# Patient Record
Sex: FEMALE | Race: ASIAN
[De-identification: names, ages, dates, MRNs, and addresses within clinical notes are randomized per-mention and may not be internally consistent; named-entity substitution may affect disease eponyms.]

---

## 2018-02-27 LAB
BASOPHILS # BLD: 0.02 K/UL (ref 0–0.2)
BASOPHILS NFR BLD: 0.3 %
EOS ABS #: 0.06 K/UL (ref 0–0.5)
EOSINOPHIL NFR BLD AUTO: 154 K/UL (ref 130–400)
HCT VFR BLD CALC: 32.8 % (ref 37–47)
HGB BLD-MCNC: 10.9 G/DL (ref 12–16)
IG#: 0.12 K/UL (ref 0–0.02)
IMM GRANULOCYTES NFR BLD AUTO: 19.2 %
LYMPHOCYTES # BLD: 1.43 K/UL (ref 1.2–3.4)
MCH RBC QN AUTO: 27.9 PG (ref 25–34)
MCHC RBC AUTO-ENTMCNC: 33.2 G/DL (ref 32–36)
MCV RBC AUTO: 84.1 FL (ref 80–100)
MONO ABS #: 0.54 K/UL (ref 0.11–0.59)
MONOCYTES NFR BLD: 7.3 %
NEUT ABS #: 5.27 K/UL (ref 1.4–6.5)
NEUTROPHILS # BLD AUTO: 0.8 %
NEUTROPHILS NFR BLD AUTO: 70.8 %
PMV BLD AUTO: 11.3 FL (ref 7.4–10.4)
RED CELL DISTRIBUTION WIDTH CV: 14.2 % (ref 11.5–14.5)
RED CELL DISTRIBUTION WIDTH SD: 43.3 FL (ref 36.4–46.3)
WBC # BLD AUTO: 7.44 K/UL (ref 4.8–10.8)

## 2018-03-14 NOTE — HISTORY & PHYSICAL EXAMINATION
DATE OF ADMISSION:  03/15/2018

 

REASON FOR ADMISSION:  Term elective repeat  section.

 

HISTORY OF PRESENT ILLNESS:  The patient is a 42-year-old female  3,

para 2, EDC 2018 at 39 weeks, admitted for an elective repeat 

section.  Prenatal course has been uncomplicated.  The patient has been

followed with us for the entire course of the pregnancy.

 

PAST MEDICAL HISTORY:  Includes hypothyroidism.

 

PAST SURGICAL HISTORY:  Includes  x2 in  and .

 

SOCIAL HISTORY:  Denies smoking, alcohol or drug use.

 

FAMILY HISTORY:  Noncontributory.

 

CURRENT MEDICATIONS:  Include prenatal vitamins.

 

ALLERGIES:  NSAIDS, CAUSING A RASH.

 

PHYSICAL EXAMINATION:

HEENT:  Within normal limits.

LUNGS:  Clear to auscultation.

COR:  Regular rate and rhythm.

ABDOMEN:  Soft, nontender, gravid.  Fetal heart tone category 1.

NEUROLOGICALLY:  Intact.

SKIN:  Warm and dry.  No rash.

EXTREMITIES:  Within normal limits.

 

ASSESSMENT:  Term pregnancy or elective repeat  section at term.

## 2018-03-15 ENCOUNTER — HOSPITAL ENCOUNTER (INPATIENT)
Dept: HOSPITAL 45 - C.LD | Age: 43
LOS: 2 days | Discharge: HOME | End: 2018-03-17
Attending: OBSTETRICS & GYNECOLOGY | Admitting: OBSTETRICS & GYNECOLOGY
Payer: COMMERCIAL

## 2018-03-15 VITALS
SYSTOLIC BLOOD PRESSURE: 105 MMHG | OXYGEN SATURATION: 97 % | DIASTOLIC BLOOD PRESSURE: 62 MMHG | TEMPERATURE: 98.96 F | HEART RATE: 64 BPM

## 2018-03-15 VITALS
TEMPERATURE: 97.52 F | DIASTOLIC BLOOD PRESSURE: 64 MMHG | HEART RATE: 65 BPM | OXYGEN SATURATION: 98 % | SYSTOLIC BLOOD PRESSURE: 107 MMHG

## 2018-03-15 VITALS — OXYGEN SATURATION: 99 % | HEART RATE: 56 BPM | SYSTOLIC BLOOD PRESSURE: 99 MMHG | DIASTOLIC BLOOD PRESSURE: 60 MMHG

## 2018-03-15 VITALS
HEART RATE: 77 BPM | SYSTOLIC BLOOD PRESSURE: 104 MMHG | DIASTOLIC BLOOD PRESSURE: 65 MMHG | TEMPERATURE: 98.06 F | OXYGEN SATURATION: 99 %

## 2018-03-15 VITALS
SYSTOLIC BLOOD PRESSURE: 108 MMHG | HEART RATE: 55 BPM | TEMPERATURE: 97.52 F | OXYGEN SATURATION: 99 % | DIASTOLIC BLOOD PRESSURE: 57 MMHG

## 2018-03-15 VITALS — OXYGEN SATURATION: 99 %

## 2018-03-15 VITALS
HEART RATE: 70 BPM | TEMPERATURE: 98.06 F | DIASTOLIC BLOOD PRESSURE: 63 MMHG | OXYGEN SATURATION: 99 % | SYSTOLIC BLOOD PRESSURE: 105 MMHG

## 2018-03-15 VITALS — OXYGEN SATURATION: 98 %

## 2018-03-15 VITALS — HEART RATE: 97 BPM | SYSTOLIC BLOOD PRESSURE: 119 MMHG | DIASTOLIC BLOOD PRESSURE: 71 MMHG | OXYGEN SATURATION: 99 %

## 2018-03-15 VITALS
BODY MASS INDEX: 25.78 KG/M2 | BODY MASS INDEX: 25.78 KG/M2 | WEIGHT: 145.51 LBS | HEIGHT: 62.99 IN | HEIGHT: 62.99 IN | WEIGHT: 145.51 LBS

## 2018-03-15 VITALS
OXYGEN SATURATION: 97 % | TEMPERATURE: 97.52 F | DIASTOLIC BLOOD PRESSURE: 53 MMHG | SYSTOLIC BLOOD PRESSURE: 97 MMHG | HEART RATE: 75 BPM

## 2018-03-15 VITALS — OXYGEN SATURATION: 97 %

## 2018-03-15 DIAGNOSIS — O09.523: ICD-10-CM

## 2018-03-15 DIAGNOSIS — E03.9: ICD-10-CM

## 2018-03-15 DIAGNOSIS — O34.211: Primary | ICD-10-CM

## 2018-03-15 DIAGNOSIS — Z3A.39: ICD-10-CM

## 2018-03-15 LAB
BASOPHILS # BLD: 0.03 K/UL (ref 0–0.2)
BASOPHILS NFR BLD: 0.4 %
EOS ABS #: 0.05 K/UL (ref 0–0.5)
EOSINOPHIL NFR BLD AUTO: 170 K/UL (ref 130–400)
HCT VFR BLD CALC: 35.6 % (ref 37–47)
HGB BLD-MCNC: 12.2 G/DL (ref 12–16)
IG#: 0.1 K/UL (ref 0–0.02)
IMM GRANULOCYTES NFR BLD AUTO: 27.9 %
LYMPHOCYTES # BLD: 2.29 K/UL (ref 1.2–3.4)
MCH RBC QN AUTO: 28.5 PG (ref 25–34)
MCHC RBC AUTO-ENTMCNC: 34.3 G/DL (ref 32–36)
MCV RBC AUTO: 83.2 FL (ref 80–100)
MONO ABS #: 0.82 K/UL (ref 0.11–0.59)
MONOCYTES NFR BLD: 10 %
NEUT ABS #: 4.91 K/UL (ref 1.4–6.5)
NEUTROPHILS # BLD AUTO: 0.6 %
NEUTROPHILS NFR BLD AUTO: 59.9 %
PMV BLD AUTO: 10.8 FL (ref 7.4–10.4)
RED CELL DISTRIBUTION WIDTH CV: 14.4 % (ref 11.5–14.5)
RED CELL DISTRIBUTION WIDTH SD: 43.6 FL (ref 36.4–46.3)
WBC # BLD AUTO: 8.2 K/UL (ref 4.8–10.8)

## 2018-03-15 RX ADMIN — SIMETHICONE SCH MG: 80 TABLET, CHEWABLE ORAL at 16:23

## 2018-03-15 RX ADMIN — DOCUSATE SODIUM SCH MG: 100 CAPSULE, LIQUID FILLED ORAL at 20:00

## 2018-03-15 RX ADMIN — OXYTOCIN SCH MLS/HR: 10 INJECTION, SOLUTION INTRAMUSCULAR; INTRAVENOUS at 17:47

## 2018-03-15 RX ADMIN — SIMETHICONE SCH MG: 80 TABLET, CHEWABLE ORAL at 09:00

## 2018-03-15 RX ADMIN — SIMETHICONE SCH MG: 80 TABLET, CHEWABLE ORAL at 20:00

## 2018-03-15 RX ADMIN — ONDANSETRON PRN MG: 2 INJECTION INTRAMUSCULAR; INTRAVENOUS at 16:23

## 2018-03-15 RX ADMIN — ONDANSETRON PRN MG: 2 INJECTION INTRAMUSCULAR; INTRAVENOUS at 09:36

## 2018-03-15 RX ADMIN — SIMETHICONE SCH MG: 80 TABLET, CHEWABLE ORAL at 12:00

## 2018-03-15 RX ADMIN — OXYTOCIN SCH MLS/HR: 10 INJECTION, SOLUTION INTRAMUSCULAR; INTRAVENOUS at 09:03

## 2018-03-15 NOTE — OPERATIVE REPORT
DATE OF OPERATION:  03/15/2018

 

PREOPERATIVE DIAGNOSIS:  Term elective repeat  section.

 

POSTOPERATIVE DIAGNOSIS:  Same.

 

PROCEDURE:  Repeat  section, low segment transverse.

 

SURGEON:  Dr. Sotomayor.

 

ASSISTANT:  Dr. Kumar.

 

ANESTHESIA:  Epidural with Duramorph.

 

CLINICAL HISTORY:  The patient is a 42-year-old female, para 2-0-0-2,

admitted for an elective repeat  section at 39 weeks.  The patient

was given informed consent.  She was given 1 gram of Ancef prior to the start

of the procedure and a time-out was called prior to the start of the

procedure.

 

DESCRIPTION OF PROCEDURE:  After satisfactory spinal anesthesia, the patient

was prepped and draped in usual sterile fashion.  A low Pfannenstiel incision

through a prior scar was then made entering into the abdominal cavity in

successive layers without difficulty.  The peritoneal cavity was opened.  The

bladder flap was made, the lower uterine segment was thin.  A low segment

transverse incision over the lower uterine segment was made.  The incision

was nicked and the amniotic fluid was noted to be clear.  The incision was

widened in the AP diameter.  The baby was in the transverse occiput

transverse position was delivered in the vertex presentation with the aid of

fundal pressure.  The baby's mouth was suctioned on the perineum.  The baby's

cord was clamped and cut and the baby handed to pediatrician.  Apgars were 9

and 9.  Birth weight was 7 pounds 2 ounces.  Cord blood was obtained. 

Placenta was delivered spontaneously intact.  Uterus was then exteriorized. 

Ring forceps were then placed on both angles in the inferior margin and then

another ring used to dilate the cervix.  Uterus was closed in double layer

closure starting with 0 Vicryl suture in a continuous interlocking fashion

followed by another imbricating layer of 0 Vicryl suture without any

difficulty.  Tubes, ovaries bilaterally were found to be within normal

limits.  The contents of the pelvic and abdominal cavity were then irrigated

to clear.  Uterus was placed back in the normal anatomical position.  The

lower uterine segment was inspected and no active bleeding was noted.  The

fascia was then reapproximated from both ends using 0 Vicryl suture in a

continuous fashion.  Subcuticular space was irrigated, bleeders were than

cauterized and the skin was undermined and the skin was then closed with

staples.  Clear urine was noted from the Chen.  Estimated blood loss 500 mL.

 Total fluids 1100 mL.  The final sponge and instrument count were found to

be correct.  The patient was placed supine on a stretcher and taken to

recovery room in stable condition.

 

 

I attest to the content of the Intraoperative Record and any orders documented therein. Any exception
s are noted below.

## 2018-03-15 NOTE — ANESTHESIOLOGY PROGRESS NOTE
Anesthesia Post Op Note


Date & Time


Mar 15, 2018 at 11:20





Notes


Mental Status:  alert / awake / arousable, participated in evaluation


Pt Amnestic to Procedure:  Yes


Nausea / Vomiting:  adequately controlled


Pain:  adequately controlled


Airway Patency, RR, SpO2:  stable & adequate


BP & HR:  stable & adequate


Hydration State:  stable & adequate


Neuraxial Anesthesia:  was administered, sensory block is resolving


Anesthetic Complications:  no major complications apparent

## 2018-03-15 NOTE — [facility] POST OPERATIVE BRIEF NOTE
Immediate Operative Summary


Operative Date


Mar 15, 2018.





Pre-Operative Diagnosis





Elective Repeat Caesaren Section





Post-Operative Diagnosis





Same





Procedure(s) Performed





Repeat Caesarean Section; Delivery of a live male child at 0758





Surgeon


Dr Sotomayor





Assistant Surgeon(s)


Dr Kumar





Estimated Blood Loss


500 cc





Findings


Consistent with Post-Op Diagnosis


live male





Fluids (cc crystalloids)


LR 1100 ml.





Specimens





cord blood





placenta-exam





Drains


None





Anesthesia Type


Spinal





Complication(s)


none





none





Disposition


Accompanied Pt To Recover:  yes


Disposition:  L&D

## 2018-03-16 VITALS — HEART RATE: 81 BPM | SYSTOLIC BLOOD PRESSURE: 91 MMHG | DIASTOLIC BLOOD PRESSURE: 57 MMHG | TEMPERATURE: 98.42 F

## 2018-03-16 VITALS
TEMPERATURE: 98.06 F | SYSTOLIC BLOOD PRESSURE: 100 MMHG | DIASTOLIC BLOOD PRESSURE: 62 MMHG | OXYGEN SATURATION: 94 % | HEART RATE: 67 BPM

## 2018-03-16 VITALS — HEART RATE: 64 BPM | DIASTOLIC BLOOD PRESSURE: 55 MMHG | SYSTOLIC BLOOD PRESSURE: 90 MMHG | TEMPERATURE: 97.16 F

## 2018-03-16 VITALS
HEART RATE: 70 BPM | SYSTOLIC BLOOD PRESSURE: 107 MMHG | DIASTOLIC BLOOD PRESSURE: 69 MMHG | TEMPERATURE: 98.06 F | OXYGEN SATURATION: 97 %

## 2018-03-16 VITALS
SYSTOLIC BLOOD PRESSURE: 102 MMHG | TEMPERATURE: 97.52 F | DIASTOLIC BLOOD PRESSURE: 63 MMHG | HEART RATE: 75 BPM | OXYGEN SATURATION: 94 %

## 2018-03-16 VITALS — OXYGEN SATURATION: 93 %

## 2018-03-16 VITALS — OXYGEN SATURATION: 94 %

## 2018-03-16 LAB
BASOPHILS # BLD: 0.02 K/UL (ref 0–0.2)
BASOPHILS NFR BLD: 0.2 %
EOS ABS #: 0.05 K/UL (ref 0–0.5)
EOSINOPHIL NFR BLD AUTO: 139 K/UL (ref 130–400)
HCT VFR BLD CALC: 33 % (ref 37–47)
HGB BLD-MCNC: 10.8 G/DL (ref 12–16)
IG#: 0.05 K/UL (ref 0–0.02)
IMM GRANULOCYTES NFR BLD AUTO: 13.6 %
LYMPHOCYTES # BLD: 1.62 K/UL (ref 1.2–3.4)
MCH RBC QN AUTO: 27.6 PG (ref 25–34)
MCHC RBC AUTO-ENTMCNC: 32.7 G/DL (ref 32–36)
MCV RBC AUTO: 84.2 FL (ref 80–100)
MONO ABS #: 0.85 K/UL (ref 0.11–0.59)
MONOCYTES NFR BLD: 7.1 %
NEUT ABS #: 9.34 K/UL (ref 1.4–6.5)
NEUTROPHILS # BLD AUTO: 0.4 %
NEUTROPHILS NFR BLD AUTO: 78.3 %
PMV BLD AUTO: 11 FL (ref 7.4–10.4)
RED CELL DISTRIBUTION WIDTH CV: 14.4 % (ref 11.5–14.5)
RED CELL DISTRIBUTION WIDTH SD: 44.5 FL (ref 36.4–46.3)
WBC # BLD AUTO: 11.93 K/UL (ref 4.8–10.8)

## 2018-03-16 RX ADMIN — DOCUSATE SODIUM SCH MG: 100 CAPSULE, LIQUID FILLED ORAL at 07:55

## 2018-03-16 RX ADMIN — OXYCODONE HYDROCHLORIDE AND ACETAMINOPHEN PRN TAB: 5; 325 TABLET ORAL at 07:55

## 2018-03-16 RX ADMIN — OXYCODONE HYDROCHLORIDE AND ACETAMINOPHEN PRN TAB: 5; 325 TABLET ORAL at 18:05

## 2018-03-16 RX ADMIN — SIMETHICONE SCH MG: 80 TABLET, CHEWABLE ORAL at 07:54

## 2018-03-16 RX ADMIN — OXYCODONE HYDROCHLORIDE AND ACETAMINOPHEN PRN TAB: 5; 325 TABLET ORAL at 22:12

## 2018-03-16 RX ADMIN — DOCUSATE SODIUM SCH MG: 100 CAPSULE, LIQUID FILLED ORAL at 19:42

## 2018-03-16 RX ADMIN — OXYCODONE HYDROCHLORIDE AND ACETAMINOPHEN PRN TAB: 5; 325 TABLET ORAL at 12:15

## 2018-03-16 RX ADMIN — FERROUS SULFATE TAB EC 325 MG (65 MG FE EQUIVALENT) SCH MG: 325 (65 FE) TABLET DELAYED RESPONSE at 07:54

## 2018-03-16 RX ADMIN — OXYCODONE HYDROCHLORIDE AND ACETAMINOPHEN PRN TAB: 5; 325 TABLET ORAL at 03:43

## 2018-03-16 RX ADMIN — SIMETHICONE SCH MG: 80 TABLET, CHEWABLE ORAL at 18:04

## 2018-03-16 RX ADMIN — Medication SCH TAB: at 07:55

## 2018-03-16 RX ADMIN — SIMETHICONE SCH MG: 80 TABLET, CHEWABLE ORAL at 12:15

## 2018-03-16 RX ADMIN — SIMETHICONE SCH MG: 80 TABLET, CHEWABLE ORAL at 19:42

## 2018-03-16 NOTE — SURGERY PROGRESS NOTE
Surgery Progress Note


Date of Service


Mar 16, 2018.





Subjective


Post OP Day:  1


+ feeling well, + ambulating, + flatus, + pain controlled





Objective


Vital Signs:











  Date Time  Temp Pulse Resp B/P (MAP) Pulse Ox O2 Delivery O2 Flow Rate FiO2


 


3/16/18 08:00 36.4 75 20 102/63 (76) 94 Room Air  


 


3/16/18 08:00      Room Air  


 


3/16/18 03:25 36.7 67 18 100/62 (75) 94 Room Air  


 


3/16/18 02:45   18  94   


 


3/16/18 01:45   16  94   


 


3/16/18 00:25   18  93   


 


3/15/18 23:35   18  97   


 


3/15/18 23:35 37.2 64 18 105/62 (76) 97 Room Air  


 


3/15/18 23:35     97 Room Air  


 


3/15/18 22:15   18  98   


 


3/15/18 21:15   16  97   


 


3/15/18 21:15   16  97   


 


3/15/18 20:15   18  98   


 


3/15/18 19:50   16  98   


 


3/15/18 19:50 36.4 65 16 107/64 (78) 98 Room Air  


 


3/15/18 18:15   18  97   


 


3/15/18 17:15   20  99   


 


3/15/18 16:15     99 Room Air  


 


3/15/18 16:15   16  99   


 


3/15/18 16:15 36.7 70 16 105/63    


 


3/15/18 15:45   20  98   


 


3/15/18 14:40   16  97   


 


3/15/18 13:40   16  99   


 


3/15/18 13:10 36.4 75 16 97/53 (68) 99 Room Air  


 


3/15/18 13:10 36.4 75 16 97/53 (68) 97 Room Air  


 


3/15/18 12:40   16  99   


 


3/15/18 12:10  97 16 119/71 (87) 99 Room Air  


 


3/15/18 11:40 36.7 77 16 104/65 (78) 99 Room Air  


 


3/15/18 11:40   16  99   


 


3/15/18 11:40     99 Room Air  


 


3/15/18 11:10  56 16 99/60 (73) 99 Room Air  








Abdomen:  non tender, non distended, soft


Incision(s):  clean, dry, intact


Extremities:  non-tender, normal inspection, no pedal edema, no calf tenderness


Laboratory Results:





Results Past 24 Hours








Test


  3/16/18


07:42 Range/Units


 


 


White Blood Count 11.93 4.8-10.8  K/uL


 


Red Blood Count 3.92 4.2-5.4  M/uL


 


Hemoglobin 10.8 12.0-16.0  g/dL


 


Hematocrit 33.0 37-47  %


 


Mean Corpuscular Volume 84.2   fL


 


Mean Corpuscular Hemoglobin 27.6 25-34  pg


 


Mean Corpuscular Hemoglobin


Concent 32.7


  32-36  g/dl


 


 


Platelet Count 139 130-400  K/uL


 


Mean Platelet Volume 11.0 7.4-10.4  fL


 


Neutrophils (%) (Auto) 78.3  %


 


Lymphocytes (%) (Auto) 13.6  %


 


Monocytes (%) (Auto) 7.1  %


 


Eosinophils (%) (Auto) 0.4  %


 


Basophils (%) (Auto) 0.2  %


 


Neutrophils # (Auto) 9.34 1.4-6.5  K/uL


 


Lymphocytes # (Auto) 1.62 1.2-3.4  K/uL


 


Monocytes # (Auto) 0.85 0.11-0.59  K/uL


 


Eosinophils # (Auto) 0.05 0-0.5  K/uL


 


Basophils # (Auto) 0.02 0-0.2  K/uL


 


RDW Standard Deviation 44.5 36.4-46.3  fL


 


RDW Coefficient of Variation 14.4 11.5-14.5  %


 


Immature Granulocyte % (Auto) 0.4  %


 


Immature Granulocyte # (Auto) 0.05 0.00-0.02  K/uL











Assessment & Plan


POD#1








 regular diet








advance care/diet

## 2018-03-17 VITALS
SYSTOLIC BLOOD PRESSURE: 98 MMHG | OXYGEN SATURATION: 96 % | DIASTOLIC BLOOD PRESSURE: 64 MMHG | TEMPERATURE: 98.78 F | HEART RATE: 75 BPM

## 2018-03-17 VITALS — TEMPERATURE: 98.78 F | HEART RATE: 75 BPM | DIASTOLIC BLOOD PRESSURE: 64 MMHG

## 2018-03-17 RX ADMIN — OXYCODONE HYDROCHLORIDE AND ACETAMINOPHEN PRN TAB: 5; 325 TABLET ORAL at 04:15

## 2018-03-17 RX ADMIN — SIMETHICONE SCH MG: 80 TABLET, CHEWABLE ORAL at 08:06

## 2018-03-17 RX ADMIN — DOCUSATE SODIUM SCH MG: 100 CAPSULE, LIQUID FILLED ORAL at 08:06

## 2018-03-17 RX ADMIN — FERROUS SULFATE TAB EC 325 MG (65 MG FE EQUIVALENT) SCH MG: 325 (65 FE) TABLET DELAYED RESPONSE at 08:06

## 2018-03-17 RX ADMIN — SIMETHICONE SCH MG: 80 TABLET, CHEWABLE ORAL at 12:54

## 2018-03-17 RX ADMIN — Medication SCH TAB: at 08:06

## 2018-03-17 RX ADMIN — OXYCODONE HYDROCHLORIDE AND ACETAMINOPHEN PRN TAB: 5; 325 TABLET ORAL at 12:54

## 2018-03-17 RX ADMIN — OXYCODONE HYDROCHLORIDE AND ACETAMINOPHEN PRN TAB: 5; 325 TABLET ORAL at 08:52

## 2018-03-17 NOTE — OB/GYN PROGRESS NOTE
OB/GYN Progress Note


Date of Service


Mar 17, 2018.





Subjective


conversation w/ patient, physical exam


Ambulation:  ambulating normally


Voiding:  no voiding problems


Passing Gas:  Yes


Diet Tolerance:  Regular Diet


Lochia:  Small


Pain:  1/10


Notes:


Doing well, no concerns. Pain well controlled. Tolerating regular diet, +flatus 

-BM. Ambulating without difficulty. Would like to go home today.





Objective


Vital Signs











  Date Time  Temp Pulse Resp B/P (MAP) Pulse Ox O2 Delivery O2 Flow Rate FiO2


 


3/17/18 08:00      Room Air  


 


3/17/18 08:00 37.1 75 18 98/64 (75) 96 Room Air  


 


3/16/18 23:30     97 Room Air  


 


3/16/18 23:30 36.7 70 18 107/69 (82) 97 Room Air  


 


3/16/18 16:00 36.2 64 16 90/55 (67)  Room Air  


 


3/16/18 16:00      Room Air  


 


3/16/18 12:00 36.9 81 18 91/57 (68)  Room Air  











Physical Exam


General Appearance:  WELL-APPEARING


Respiratory/Chest:  chest non-tender, lungs clear


Cardiovascular:  regular rate, rhythm


Abdomen:  normal bowel sounds, soft


Fundus:  Firm


Incision Description:  Clean, Dry & Intact


Extremities:  normal range of motion, non-tender, no calf tenderness





Assessment and Plan


Post-Op


Day Number:  2


Continue Routine Care:


-D/C home today


-F/U in 1 week for staple removal.

## 2018-03-17 NOTE — DISCHARGE INSTRUCTIONS
Discharge Instructions


Date of Service


Mar 17, 2018.





Admission


Reason for Admission:  Previous  Section





Discharge


Discharge Diagnosis / Problem:  Repeat  section





Discharge Goals


Goal(s):  Routine recovery after 





Activity Recommendations


Activity Limitations:  per Instructions/Follow-up section





.





Instructions / Follow-Up


Instructions / Follow-Up


ACTIVITY RECOMMENDATIONS:





* Gradual return to full activity over the next 2-3 weeks.


* No lifting - nothing heavier than baby over the next 2-3 weeks.


* Do not engage in vigorous exercise, sexual activity or sports until cleared 

by 


   your physician.


* Do not drive or operate any motorized equipment until cleared by your 

physician.


* You may shower/bathe daily.








BREAST CARE:





If you are not breast feeding:





*  Wear a supportive bra 24 hours a day for one to two weeks.


*  Avoid stimulating your breasts and nipples as much as possible during the


    first few weeks after delivery.


*  When taking a shower, have the warm water hit your back, not breasts.


*  When your breasts feel full, apply ice packs.  Usually three to four times 


    a day helps ease the discomfort.


*  Take a mild pain medication (Tylenol/Motrin) when you are uncomfortable.





If breast feeding:





*  Use breast milk to lubricate nipples.  Lansinoh cream may be used for sore 

nipples. 


    You do not need to remove cream prior to breast feeding.  If using a 

different 


    brand of cream, check the label for directions regarding removal of cream 

prior 


    to nursing.


*  Wear a supportive bra.


*  If having problems with breasts or breast feeding, call a lactation 

consultant or 


    your health care provider.








OVER THE COUNTER MEDICATION:





*  For discomfort or pain, you may use Acetaminophen (Tylenol), Ibuprofen (Advil

),


    or Naproxen (Aleve) following the package directions. 


*  For constipation you may use Colace following the package directions.








SPECIAL CARE INSTRUCTIONS:





When you are discharged from the hospital, it is important for you to follow 

the 


instructions listed below:





*  During the first week at home, you should be able to care for yourself and 


    your baby.  In addition, the usual light household activities are 

encouraged.





*  Limit your activities to the way you feel.  Do not try to clean the house or 


    move furniture.  Be sensible.





*  If you actively engage in sports and have done so up until the time of your 


    delivery, you may resume these activities as soon as you feel able.  This 

may


    take up to one month or even longer.  Use good judgment.





*  Continue to take your prenatal vitamins for at least six weeks after the 

birth


    of your baby.





*  Your diet need not be limited unless you were on a special diet before your 


    delivery.  Breast-feeding mothers need around 2500 calories per day and at 


    least 64-80 ounces of fluid per day (8 to 10 glasses).





*  You should eat foods from the four major food groups.  Crash diets or fad 

diets


    are to be avoided.  Eating lean meats, fresh fruits and vegetables, low-fat 


    dairy products, high fiber foods and a regular exercise program, will help 

you 


    get back to your pre-pregnancy weight without putting your health at risk.





*  Constipation is sometimes a problem after delivery.  Take a mild laxative as 


    needed.  If breast feeding, Milk of Magnesia is acceptable to use. You may


    use a suppository or Fleets enema if no episiotomy.





*  A daily shower or tub bath is suggested.  Be sure to thoroughly and gently 


    dry the perineum.





*  A bloody vaginal discharge will usually continue until around four weeks post


    partum.  A small amount of bleeding may continue for as long as six weeks.  


    Vaginal discharge changes from the bright red bleeding after delivery to 

pink


    then brownish and finally yellowish-pink before becoming white and 

disappearing.





*  Bleeding may increase with activity.  Your first period may come in 4-8 

weeks. 


    If you are breast feeding, your period may be delayed even longer.





*  [place] (sex) can begin whenever both you and your partner feel 

comfortable


    and do not have any form of genital infection.  It is recommended that you 

wait


    at least six weeks for internal and external healing to occur.  If you have 


    questions, please talk to your health care practitioner.  A condom should 

be used


    to prevent infection and pregnancy.





*  Foreplay, gentle intercourse and lubrication is very important the first 

several times


    to prevent pain.  A water-based lubricant such as K-Y jelly or Astroglide 

may be used.





*  Tampons and/or Douching should be avoided until after six weeks postpartum 

check-up.





*  If you have RH negative blood and your baby is RH positive, you will receive 

RHOGAM


    by injection prior to discharge.  The nurse will give you a card to keep 

with you that 


    has the date and place that you received RHOGAM after delivery.





*  During your prenatal care, you had a Rubella screen done to check for the 

presence of 


    rubella antibodies in your blood.  If your test was negative, you will 

receive a Rubella 


    vaccine prior to discharge.  This vaccine may cause a fever, soreness at 

the injection 


    site and flu-like symptoms.  If these symptoms persist, notify your health 

care 


    practitioner.  Pregnancy is not advised for three months after a Rubella 

vaccine.  





*  Verbalizes understanding of car seat law as reviewed with patient nursing.





*  Car Seat hand-out given and reviewed with patient by nursing.





*  Shaken baby information reviewed with patient by nursing.


 





Call you doctor if:





*  Heavy bleeding (saturating several pads an hour) or passing clots the size 

of your fist.


*  A fever >101 degrees F (38.3 degrees C) on two occasions four hours apart and

/or chills.


*  Unusual pain in the pelvic or vaginal areas.  Pain should improve each day 

postpartum.


*  Call the doctor for any increased redness, drainage or swelling around the 

incision and


    any pain unrelieved by prescribed pain medication.


*  Any signs or symptoms of phlebitis (possible blood clots forming in the veins

): leg pain,


    warm, red or swollen area on leg.


*  "Baby Blues" lasting longer than two weeks.





If you have any questions or concerns, call your health care practitioner at 


[phone].








FOLLOW-UP VISIT:





*  Incision check (staple removal) in 1 week.  Please call doctor's office at 


    [phone] to set up appointment.





*  Please call the office at [phone] to schedule a 6 week postpartum 


    examination.  It is important you keep this appointment. 





*  It is important for you to make arrangements for either yearly or twice 

yearly 


    check-ups thereafter.





Current Hospital Diet


Patient's current hospital diet: Regular Diet





Discharge Diet


Recommended Diet:  Regular OB Diet





Procedures


Procedures Performed:  


Repeat Caesarean Section; Delivery of a live male child at 0758





Pending Studies


Studies pending at discharge:  no





Medical Emergencies








.


Who to Call and When:





Medical Emergencies:  If at any time you feel your situation is an emergency, 

please call 911 immediately.





.





Non-Emergent Contact


Non-Emergency issues call your:  Primary Care Provider, Gynecologist





.


.








"Provider Documentation" section prepared by Solomon Kumar.








.





[place] Drug Monitoring Program


Search Results:  no issues identified